# Patient Record
Sex: MALE | Race: WHITE | NOT HISPANIC OR LATINO | Employment: OTHER | URBAN - METROPOLITAN AREA
[De-identification: names, ages, dates, MRNs, and addresses within clinical notes are randomized per-mention and may not be internally consistent; named-entity substitution may affect disease eponyms.]

---

## 2023-02-19 PROBLEM — I48.91 ATRIAL FIBRILLATION (MULTI): Status: ACTIVE | Noted: 2023-02-19

## 2023-02-19 PROBLEM — E53.8 VITAMIN B12 DEFICIENCY: Status: ACTIVE | Noted: 2023-02-19

## 2023-02-19 PROBLEM — D68.9 COAGULOPATHY (MULTI): Status: ACTIVE | Noted: 2023-02-19

## 2023-02-19 PROBLEM — I35.0 AORTIC STENOSIS, MODERATE: Status: ACTIVE | Noted: 2023-02-19

## 2023-02-19 PROBLEM — I67.89 ACUTE ILL-DEFINED CEREBROVASCULAR DISEASE: Status: ACTIVE | Noted: 2023-02-19

## 2023-02-19 PROBLEM — R73.09 ABNORMAL GLUCOSE: Status: ACTIVE | Noted: 2023-02-19

## 2023-02-19 PROBLEM — K50.90 CROHN'S DISEASE (MULTI): Status: ACTIVE | Noted: 2023-02-19

## 2023-02-19 PROBLEM — R19.7 DIARRHEA: Status: ACTIVE | Noted: 2023-02-19

## 2023-02-19 PROBLEM — R01.1 HEART MURMUR: Status: ACTIVE | Noted: 2023-02-19

## 2023-02-19 PROBLEM — M10.9 GOUT, JOINT: Status: ACTIVE | Noted: 2023-02-19

## 2023-02-19 PROBLEM — L81.9 PIGMENTED SKIN LESION SUSPICIOUS FOR MALIGNANT NEOPLASM: Status: ACTIVE | Noted: 2023-02-19

## 2023-02-19 RX ORDER — LANOLIN ALCOHOL/MO/W.PET/CERES
1 CREAM (GRAM) TOPICAL DAILY
COMMUNITY
Start: 2018-08-15

## 2023-02-19 RX ORDER — WARFARIN 6 MG/1
1 TABLET ORAL
COMMUNITY
Start: 2015-03-17 | End: 2023-10-09 | Stop reason: SDUPTHER

## 2023-03-13 ENCOUNTER — APPOINTMENT (OUTPATIENT)
Dept: PRIMARY CARE | Facility: CLINIC | Age: 88
End: 2023-03-13
Payer: MEDICARE

## 2023-03-13 ENCOUNTER — ANTICOAGULATION - WARFARIN VISIT (OUTPATIENT)
Dept: PRIMARY CARE | Facility: CLINIC | Age: 88
End: 2023-03-13

## 2023-03-13 VITALS — SYSTOLIC BLOOD PRESSURE: 134 MMHG | DIASTOLIC BLOOD PRESSURE: 70 MMHG

## 2023-03-27 ENCOUNTER — APPOINTMENT (OUTPATIENT)
Dept: PRIMARY CARE | Facility: CLINIC | Age: 88
End: 2023-03-27
Payer: MEDICARE

## 2023-03-27 RX ORDER — WARFARIN 7.5 MG/1
TABLET ORAL
COMMUNITY
Start: 2013-11-11

## 2023-03-27 RX ORDER — POTASSIUM CHLORIDE 20 MEQ/1
20 TABLET, EXTENDED RELEASE ORAL 2 TIMES DAILY
COMMUNITY

## 2023-03-27 RX ORDER — DIGOXIN 125 MCG
0.12 TABLET ORAL
COMMUNITY
Start: 2014-04-16

## 2023-03-27 RX ORDER — WARFARIN 3 MG/1
3 TABLET ORAL NIGHTLY
COMMUNITY
End: 2023-04-11 | Stop reason: SDUPTHER

## 2023-03-27 RX ORDER — ALLOPURINOL 300 MG/1
1 TABLET ORAL DAILY
COMMUNITY
Start: 2007-05-02

## 2023-03-27 RX ORDER — WARFARIN 3 MG/1
3 TABLET ORAL NIGHTLY
Qty: 90 TABLET | Refills: 1 | Status: CANCELLED | OUTPATIENT
Start: 2023-03-27

## 2023-04-11 ENCOUNTER — CLINICAL SUPPORT (OUTPATIENT)
Dept: PRIMARY CARE | Facility: CLINIC | Age: 88
End: 2023-04-11
Payer: MEDICARE

## 2023-04-11 ENCOUNTER — ANTICOAGULATION - WARFARIN VISIT (OUTPATIENT)
Dept: PRIMARY CARE | Facility: CLINIC | Age: 88
End: 2023-04-11

## 2023-04-11 DIAGNOSIS — I48.11 LONGSTANDING PERSISTENT ATRIAL FIBRILLATION (MULTI): ICD-10-CM

## 2023-04-11 DIAGNOSIS — I48.21 PERMANENT ATRIAL FIBRILLATION (MULTI): ICD-10-CM

## 2023-04-11 DIAGNOSIS — I48.91 ATRIAL FIBRILLATION, UNSPECIFIED TYPE (MULTI): Primary | ICD-10-CM

## 2023-04-11 LAB
POC INR: 2.7
POC INR: 2.7
POC PROTHROMBIN TIME: NORMAL
POC PROTHROMBIN TIME: NORMAL

## 2023-04-11 PROCEDURE — 85610 PROTHROMBIN TIME: CPT | Performed by: STUDENT IN AN ORGANIZED HEALTH CARE EDUCATION/TRAINING PROGRAM

## 2023-04-17 RX ORDER — WARFARIN 3 MG/1
3 TABLET ORAL NIGHTLY
Qty: 90 TABLET | Refills: 1 | Status: SHIPPED | OUTPATIENT
Start: 2023-04-17 | End: 2023-10-09 | Stop reason: SDUPTHER

## 2023-05-08 ENCOUNTER — ANTICOAGULATION - WARFARIN VISIT (OUTPATIENT)
Dept: PRIMARY CARE | Facility: CLINIC | Age: 88
End: 2023-05-08

## 2023-05-08 ENCOUNTER — APPOINTMENT (OUTPATIENT)
Dept: PRIMARY CARE | Facility: CLINIC | Age: 88
End: 2023-05-08
Payer: MEDICARE

## 2023-05-08 DIAGNOSIS — I48.21 PERMANENT ATRIAL FIBRILLATION (MULTI): ICD-10-CM

## 2023-05-08 LAB
POC INR: 2.6
POC PROTHROMBIN TIME: NORMAL

## 2023-05-08 PROCEDURE — 85610 PROTHROMBIN TIME: CPT | Performed by: STUDENT IN AN ORGANIZED HEALTH CARE EDUCATION/TRAINING PROGRAM

## 2023-06-05 ENCOUNTER — ANTICOAGULATION - WARFARIN VISIT (OUTPATIENT)
Dept: PRIMARY CARE | Facility: CLINIC | Age: 88
End: 2023-06-05

## 2023-06-05 ENCOUNTER — APPOINTMENT (OUTPATIENT)
Dept: PRIMARY CARE | Facility: CLINIC | Age: 88
End: 2023-06-05
Payer: MEDICARE

## 2023-06-05 DIAGNOSIS — I48.91 ATRIAL FIBRILLATION, UNSPECIFIED TYPE (MULTI): ICD-10-CM

## 2023-06-05 LAB
POC INR: 2.5
POC PROTHROMBIN TIME: NORMAL

## 2023-07-03 ENCOUNTER — CLINICAL SUPPORT (OUTPATIENT)
Dept: PRIMARY CARE | Facility: CLINIC | Age: 88
End: 2023-07-03
Payer: MEDICARE

## 2023-07-03 DIAGNOSIS — I48.91 ATRIAL FIBRILLATION, UNSPECIFIED TYPE (MULTI): ICD-10-CM

## 2023-07-03 LAB
POC INR: 3.4
POC PROTHROMBIN TIME: NORMAL

## 2023-07-03 PROCEDURE — 85610 PROTHROMBIN TIME: CPT | Performed by: STUDENT IN AN ORGANIZED HEALTH CARE EDUCATION/TRAINING PROGRAM

## 2023-07-05 ENCOUNTER — OFFICE VISIT (OUTPATIENT)
Dept: PRIMARY CARE | Facility: CLINIC | Age: 88
End: 2023-07-05
Payer: MEDICARE

## 2023-07-05 DIAGNOSIS — I48.91 ATRIAL FIBRILLATION, UNSPECIFIED TYPE (MULTI): ICD-10-CM

## 2023-07-05 LAB
POC INR: 2.3
POC PROTHROMBIN TIME: NORMAL

## 2023-07-05 PROCEDURE — 1159F MED LIST DOCD IN RCRD: CPT | Performed by: STUDENT IN AN ORGANIZED HEALTH CARE EDUCATION/TRAINING PROGRAM

## 2023-07-05 PROCEDURE — 85610 PROTHROMBIN TIME: CPT | Performed by: STUDENT IN AN ORGANIZED HEALTH CARE EDUCATION/TRAINING PROGRAM

## 2023-07-07 ENCOUNTER — ANTICOAGULATION - WARFARIN VISIT (OUTPATIENT)
Dept: PRIMARY CARE | Facility: CLINIC | Age: 88
End: 2023-07-07
Payer: MEDICARE

## 2023-07-07 DIAGNOSIS — I48.91 ATRIAL FIBRILLATION, UNSPECIFIED TYPE (MULTI): ICD-10-CM

## 2023-07-07 LAB
POC INR: 3.4
POC INR: 3.4
POC PROTHROMBIN TIME: NORMAL
POC PROTHROMBIN TIME: NORMAL

## 2023-07-07 PROCEDURE — 85610 PROTHROMBIN TIME: CPT | Performed by: STUDENT IN AN ORGANIZED HEALTH CARE EDUCATION/TRAINING PROGRAM

## 2023-07-10 ENCOUNTER — ANTICOAGULATION - WARFARIN VISIT (OUTPATIENT)
Dept: PRIMARY CARE | Facility: CLINIC | Age: 88
End: 2023-07-10

## 2023-07-10 ENCOUNTER — CLINICAL SUPPORT (OUTPATIENT)
Dept: PRIMARY CARE | Facility: CLINIC | Age: 88
End: 2023-07-10
Payer: MEDICARE

## 2023-07-10 DIAGNOSIS — I48.91 ATRIAL FIBRILLATION, UNSPECIFIED TYPE (MULTI): ICD-10-CM

## 2023-07-10 LAB
POC INR: 2
POC INR: 2
POC PROTHROMBIN TIME: NORMAL
POC PROTHROMBIN TIME: NORMAL

## 2023-07-10 PROCEDURE — 85610 PROTHROMBIN TIME: CPT | Performed by: STUDENT IN AN ORGANIZED HEALTH CARE EDUCATION/TRAINING PROGRAM

## 2023-07-17 ENCOUNTER — ANTICOAGULATION - WARFARIN VISIT (OUTPATIENT)
Dept: PRIMARY CARE | Facility: CLINIC | Age: 88
End: 2023-07-17

## 2023-07-17 ENCOUNTER — APPOINTMENT (OUTPATIENT)
Dept: PRIMARY CARE | Facility: CLINIC | Age: 88
End: 2023-07-17
Payer: MEDICARE

## 2023-07-17 DIAGNOSIS — I48.91 ATRIAL FIBRILLATION, UNSPECIFIED TYPE (MULTI): ICD-10-CM

## 2023-07-17 LAB
POC INR: 3
POC PROTHROMBIN TIME: NORMAL

## 2023-07-24 ENCOUNTER — APPOINTMENT (OUTPATIENT)
Dept: PRIMARY CARE | Facility: CLINIC | Age: 88
End: 2023-07-24
Payer: MEDICARE

## 2023-07-24 ENCOUNTER — ANTICOAGULATION - WARFARIN VISIT (OUTPATIENT)
Dept: PRIMARY CARE | Facility: CLINIC | Age: 88
End: 2023-07-24

## 2023-07-24 DIAGNOSIS — I48.91 ATRIAL FIBRILLATION, UNSPECIFIED TYPE (MULTI): ICD-10-CM

## 2023-07-24 LAB
POC INR: 3
POC PROTHROMBIN TIME: NORMAL

## 2023-08-07 ENCOUNTER — ANTICOAGULATION - WARFARIN VISIT (OUTPATIENT)
Dept: PRIMARY CARE | Facility: CLINIC | Age: 88
End: 2023-08-07

## 2023-08-07 ENCOUNTER — APPOINTMENT (OUTPATIENT)
Dept: PRIMARY CARE | Facility: CLINIC | Age: 88
End: 2023-08-07
Payer: MEDICARE

## 2023-08-07 DIAGNOSIS — I48.91 ATRIAL FIBRILLATION, UNSPECIFIED TYPE (MULTI): ICD-10-CM

## 2023-08-07 LAB
POC INR: 2.2
POC PROTHROMBIN TIME: NORMAL

## 2023-08-07 PROCEDURE — 85610 PROTHROMBIN TIME: CPT | Performed by: STUDENT IN AN ORGANIZED HEALTH CARE EDUCATION/TRAINING PROGRAM

## 2023-09-05 ENCOUNTER — APPOINTMENT (OUTPATIENT)
Dept: PRIMARY CARE | Facility: CLINIC | Age: 88
End: 2023-09-05
Payer: MEDICARE

## 2023-09-05 ENCOUNTER — ANTICOAGULATION - WARFARIN VISIT (OUTPATIENT)
Dept: PRIMARY CARE | Facility: CLINIC | Age: 88
End: 2023-09-05

## 2023-09-05 DIAGNOSIS — I48.91 ATRIAL FIBRILLATION, UNSPECIFIED TYPE (MULTI): ICD-10-CM

## 2023-09-05 PROBLEM — K80.20 GALLSTONE: Status: RESOLVED | Noted: 2023-09-05 | Resolved: 2023-09-05

## 2023-09-05 PROBLEM — L82.1 OTHER SEBORRHEIC KERATOSIS: Status: RESOLVED | Noted: 2019-09-27 | Resolved: 2023-09-05

## 2023-09-05 PROBLEM — D18.01 HEMANGIOMA OF SKIN AND SUBCUTANEOUS TISSUE: Status: RESOLVED | Noted: 2019-09-27 | Resolved: 2023-09-05

## 2023-09-05 PROBLEM — D48.5 NEOPLASM OF UNCERTAIN BEHAVIOR OF SKIN: Status: RESOLVED | Noted: 2019-09-27 | Resolved: 2023-09-05

## 2023-09-05 PROBLEM — C44.319 BASAL CELL CARCINOMA OF SKIN OF OTHER PARTS OF FACE: Status: RESOLVED | Noted: 2019-09-27 | Resolved: 2023-09-05

## 2023-09-05 PROBLEM — I47.19 ATRIAL TACHYCARDIA (CMS-HCC): Status: ACTIVE | Noted: 2023-02-19

## 2023-09-05 PROBLEM — L57.0 ACTINIC KERATOSIS: Status: RESOLVED | Noted: 2019-09-27 | Resolved: 2023-09-05

## 2023-09-05 PROBLEM — G45.9 TIA (TRANSIENT ISCHEMIC ATTACK): Status: ACTIVE | Noted: 2023-02-19

## 2023-09-05 PROBLEM — D64.9 ABSOLUTE ANEMIA: Status: RESOLVED | Noted: 2023-09-05 | Resolved: 2023-09-05

## 2023-09-05 LAB
POC INR: 2.3
POC PROTHROMBIN TIME: NORMAL

## 2023-09-05 PROCEDURE — 85610 PROTHROMBIN TIME: CPT | Performed by: STUDENT IN AN ORGANIZED HEALTH CARE EDUCATION/TRAINING PROGRAM

## 2023-10-02 ENCOUNTER — APPOINTMENT (OUTPATIENT)
Dept: PRIMARY CARE | Facility: CLINIC | Age: 88
End: 2023-10-02
Payer: MEDICARE

## 2023-10-02 ENCOUNTER — ANTICOAGULATION - WARFARIN VISIT (OUTPATIENT)
Dept: PRIMARY CARE | Facility: CLINIC | Age: 88
End: 2023-10-02

## 2023-10-02 DIAGNOSIS — I48.91 ATRIAL FIBRILLATION, UNSPECIFIED TYPE (MULTI): ICD-10-CM

## 2023-10-02 LAB
POC INR: 2.5
POC PROTHROMBIN TIME: NORMAL

## 2023-10-02 PROCEDURE — 85610 PROTHROMBIN TIME: CPT | Performed by: STUDENT IN AN ORGANIZED HEALTH CARE EDUCATION/TRAINING PROGRAM

## 2023-10-09 DIAGNOSIS — I48.91 ATRIAL FIBRILLATION, UNSPECIFIED TYPE (MULTI): ICD-10-CM

## 2023-10-09 RX ORDER — WARFARIN 3 MG/1
3 TABLET ORAL NIGHTLY
Qty: 90 TABLET | Refills: 1 | Status: SHIPPED | OUTPATIENT
Start: 2023-10-09

## 2023-10-09 RX ORDER — WARFARIN 6 MG/1
6 TABLET ORAL NIGHTLY
Qty: 90 TABLET | Refills: 1 | Status: SHIPPED | OUTPATIENT
Start: 2023-10-09

## 2023-10-30 ENCOUNTER — ANTICOAGULATION - WARFARIN VISIT (OUTPATIENT)
Dept: PRIMARY CARE | Facility: CLINIC | Age: 88
End: 2023-10-30

## 2023-10-30 ENCOUNTER — APPOINTMENT (OUTPATIENT)
Dept: PRIMARY CARE | Facility: CLINIC | Age: 88
End: 2023-10-30
Payer: MEDICARE

## 2023-10-30 DIAGNOSIS — I48.91 ATRIAL FIBRILLATION, UNSPECIFIED TYPE (MULTI): ICD-10-CM

## 2023-10-30 LAB
POC INR: 2.5
POC PROTHROMBIN TIME: NORMAL

## 2023-10-30 PROCEDURE — 85610 PROTHROMBIN TIME: CPT | Performed by: STUDENT IN AN ORGANIZED HEALTH CARE EDUCATION/TRAINING PROGRAM

## 2023-11-27 ENCOUNTER — CLINICAL SUPPORT (OUTPATIENT)
Dept: PRIMARY CARE | Facility: CLINIC | Age: 88
End: 2023-11-27
Payer: MEDICARE

## 2023-11-27 ENCOUNTER — ANTICOAGULATION - WARFARIN VISIT (OUTPATIENT)
Dept: PRIMARY CARE | Facility: CLINIC | Age: 88
End: 2023-11-27

## 2023-11-27 DIAGNOSIS — I47.19 ATRIAL TACHYCARDIA (CMS-HCC): ICD-10-CM

## 2023-11-27 DIAGNOSIS — I48.91 ATRIAL FIBRILLATION, UNSPECIFIED TYPE (MULTI): ICD-10-CM

## 2023-11-27 LAB
POC INR: 3
POC PROTHROMBIN TIME: NORMAL

## 2023-11-27 PROCEDURE — 85610 PROTHROMBIN TIME: CPT | Performed by: STUDENT IN AN ORGANIZED HEALTH CARE EDUCATION/TRAINING PROGRAM

## 2023-12-22 ENCOUNTER — ANTICOAGULATION - WARFARIN VISIT (OUTPATIENT)
Dept: PRIMARY CARE | Facility: CLINIC | Age: 88
End: 2023-12-22

## 2023-12-22 ENCOUNTER — APPOINTMENT (OUTPATIENT)
Dept: PRIMARY CARE | Facility: CLINIC | Age: 88
End: 2023-12-22
Payer: MEDICARE

## 2023-12-22 DIAGNOSIS — I48.91 ATRIAL FIBRILLATION, UNSPECIFIED TYPE (MULTI): ICD-10-CM

## 2023-12-22 LAB
POC INR: 2.7
POC PROTHROMBIN TIME: NORMAL

## 2023-12-22 PROCEDURE — 85610 PROTHROMBIN TIME: CPT | Performed by: STUDENT IN AN ORGANIZED HEALTH CARE EDUCATION/TRAINING PROGRAM

## 2024-01-01 ENCOUNTER — HOSPITAL ENCOUNTER (INPATIENT)
Facility: HOSPITAL | Age: 89
LOS: 1 days | End: 2024-07-12
Attending: EMERGENCY MEDICINE | Admitting: INTERNAL MEDICINE
Payer: MEDICARE

## 2024-01-01 ENCOUNTER — APPOINTMENT (OUTPATIENT)
Dept: RADIOLOGY | Facility: HOSPITAL | Age: 89
End: 2024-01-01
Payer: MEDICARE

## 2024-01-01 ENCOUNTER — TELEPHONE (OUTPATIENT)
Dept: PRIMARY CARE | Facility: CLINIC | Age: 89
End: 2024-01-01
Payer: MEDICARE

## 2024-01-01 VITALS
TEMPERATURE: 97.7 F | OXYGEN SATURATION: 90 % | WEIGHT: 160.94 LBS | DIASTOLIC BLOOD PRESSURE: 91 MMHG | HEART RATE: 91 BPM | SYSTOLIC BLOOD PRESSURE: 188 MMHG | BODY MASS INDEX: 21.33 KG/M2 | RESPIRATION RATE: 24 BRPM | HEIGHT: 73 IN

## 2024-01-01 DIAGNOSIS — I61.9 HEMORRHAGIC STROKE (MULTI): Primary | ICD-10-CM

## 2024-01-01 LAB
ALBUMIN SERPL BCP-MCNC: 4.4 G/DL (ref 3.4–5)
ALP SERPL-CCNC: 57 U/L (ref 33–136)
ALT SERPL W P-5'-P-CCNC: 12 U/L (ref 10–52)
ANION GAP SERPL CALC-SCNC: 11 MMOL/L (ref 10–20)
APTT PPP: 36 SECONDS (ref 27–38)
AST SERPL W P-5'-P-CCNC: 23 U/L (ref 9–39)
BASOPHILS # BLD AUTO: 0.03 X10*3/UL (ref 0–0.1)
BASOPHILS NFR BLD AUTO: 0.3 %
BILIRUB SERPL-MCNC: 0.5 MG/DL (ref 0–1.2)
BUN SERPL-MCNC: 21 MG/DL (ref 6–23)
CALCIUM SERPL-MCNC: 9.3 MG/DL (ref 8.6–10.3)
CARDIAC TROPONIN I PNL SERPL HS: 11 NG/L (ref 0–20)
CHLORIDE SERPL-SCNC: 105 MMOL/L (ref 98–107)
CO2 SERPL-SCNC: 28 MMOL/L (ref 21–32)
CREAT SERPL-MCNC: 0.92 MG/DL (ref 0.5–1.3)
EGFRCR SERPLBLD CKD-EPI 2021: 80 ML/MIN/1.73M*2
EOSINOPHIL # BLD AUTO: 0.11 X10*3/UL (ref 0–0.4)
EOSINOPHIL NFR BLD AUTO: 1 %
ERYTHROCYTE [DISTWIDTH] IN BLOOD BY AUTOMATED COUNT: 13.8 % (ref 11.5–14.5)
GLUCOSE SERPL-MCNC: 149 MG/DL (ref 74–99)
HCT VFR BLD AUTO: 33.7 % (ref 41–52)
HGB BLD-MCNC: 11.4 G/DL (ref 13.5–17.5)
HOLD SPECIMEN: NORMAL
IMM GRANULOCYTES # BLD AUTO: 0.04 X10*3/UL (ref 0–0.5)
IMM GRANULOCYTES NFR BLD AUTO: 0.4 % (ref 0–0.9)
INR PPP: 2.3 (ref 0.9–1.1)
LYMPHOCYTES # BLD AUTO: 3.88 X10*3/UL (ref 0.8–3)
LYMPHOCYTES NFR BLD AUTO: 35.9 %
MCH RBC QN AUTO: 33.5 PG (ref 26–34)
MCHC RBC AUTO-ENTMCNC: 33.8 G/DL (ref 32–36)
MCV RBC AUTO: 99 FL (ref 80–100)
MONOCYTES # BLD AUTO: 0.81 X10*3/UL (ref 0.05–0.8)
MONOCYTES NFR BLD AUTO: 7.5 %
NEUTROPHILS # BLD AUTO: 5.95 X10*3/UL (ref 1.6–5.5)
NEUTROPHILS NFR BLD AUTO: 54.9 %
NRBC BLD-RTO: 0 /100 WBCS (ref 0–0)
PLATELET # BLD AUTO: 219 X10*3/UL (ref 150–450)
POTASSIUM SERPL-SCNC: 3.4 MMOL/L (ref 3.5–5.3)
PROT SERPL-MCNC: 7.3 G/DL (ref 6.4–8.2)
PROTHROMBIN TIME: 25.6 SECONDS (ref 9.8–12.8)
RBC # BLD AUTO: 3.4 X10*6/UL (ref 4.5–5.9)
SODIUM SERPL-SCNC: 141 MMOL/L (ref 136–145)
WBC # BLD AUTO: 10.8 X10*3/UL (ref 4.4–11.3)

## 2024-01-01 PROCEDURE — 85730 THROMBOPLASTIN TIME PARTIAL: CPT | Performed by: EMERGENCY MEDICINE

## 2024-01-01 PROCEDURE — 2500000004 HC RX 250 GENERAL PHARMACY W/ HCPCS (ALT 636 FOR OP/ED)

## 2024-01-01 PROCEDURE — 96374 THER/PROPH/DIAG INJ IV PUSH: CPT

## 2024-01-01 PROCEDURE — 36415 COLL VENOUS BLD VENIPUNCTURE: CPT | Performed by: EMERGENCY MEDICINE

## 2024-01-01 PROCEDURE — 70450 CT HEAD/BRAIN W/O DYE: CPT

## 2024-01-01 PROCEDURE — 85610 PROTHROMBIN TIME: CPT | Performed by: EMERGENCY MEDICINE

## 2024-01-01 PROCEDURE — 85025 COMPLETE CBC W/AUTO DIFF WBC: CPT | Performed by: EMERGENCY MEDICINE

## 2024-01-01 PROCEDURE — 80053 COMPREHEN METABOLIC PANEL: CPT | Performed by: EMERGENCY MEDICINE

## 2024-01-01 PROCEDURE — 2500000004 HC RX 250 GENERAL PHARMACY W/ HCPCS (ALT 636 FOR OP/ED): Performed by: EMERGENCY MEDICINE

## 2024-01-01 PROCEDURE — 70450 CT HEAD/BRAIN W/O DYE: CPT | Performed by: RADIOLOGY

## 2024-01-01 PROCEDURE — 1210000001 HC SEMI-PRIVATE ROOM DAILY

## 2024-01-01 PROCEDURE — 84484 ASSAY OF TROPONIN QUANT: CPT | Performed by: EMERGENCY MEDICINE

## 2024-01-01 PROCEDURE — 99291 CRITICAL CARE FIRST HOUR: CPT | Performed by: EMERGENCY MEDICINE

## 2024-01-01 RX ORDER — LEVETIRACETAM 15 MG/ML
1500 INJECTION INTRAVASCULAR ONCE
Status: COMPLETED | OUTPATIENT
Start: 2024-01-01 | End: 2024-01-01

## 2024-01-01 RX ORDER — LORAZEPAM 2 MG/ML
0.5 INJECTION INTRAMUSCULAR EVERY 4 HOURS PRN
Status: DISCONTINUED | OUTPATIENT
Start: 2024-01-01 | End: 2024-01-01 | Stop reason: HOSPADM

## 2024-01-01 RX ORDER — HYDROMORPHONE HYDROCHLORIDE 2 MG/1
1 TABLET ORAL
Status: DISCONTINUED | OUTPATIENT
Start: 2024-01-01 | End: 2024-01-01 | Stop reason: HOSPADM

## 2024-01-01 RX ORDER — LORAZEPAM 2 MG/ML
2 INJECTION INTRAMUSCULAR EVERY 10 MIN PRN
Status: COMPLETED | OUTPATIENT
Start: 2024-01-01 | End: 2024-01-01

## 2024-01-01 RX ORDER — MORPHINE SULFATE 4 MG/ML
4 INJECTION, SOLUTION INTRAMUSCULAR; INTRAVENOUS
Status: DISCONTINUED | OUTPATIENT
Start: 2024-01-01 | End: 2024-01-01 | Stop reason: HOSPADM

## 2024-01-01 RX ORDER — NICARDIPINE HYDROCHLORIDE 0.2 MG/ML
2.5-15 INJECTION INTRAVENOUS CONTINUOUS
Status: DISCONTINUED | OUTPATIENT
Start: 2024-01-01 | End: 2024-01-01

## 2024-01-01 RX ORDER — KETOROLAC TROMETHAMINE 30 MG/ML
15 INJECTION, SOLUTION INTRAMUSCULAR; INTRAVENOUS EVERY 6 HOURS PRN
Status: DISCONTINUED | OUTPATIENT
Start: 2024-01-01 | End: 2024-01-01

## 2024-01-01 RX ORDER — HALOPERIDOL 5 MG/ML
1 INJECTION INTRAMUSCULAR EVERY 4 HOURS PRN
Status: DISCONTINUED | OUTPATIENT
Start: 2024-01-01 | End: 2024-01-01 | Stop reason: HOSPADM

## 2024-01-01 RX ORDER — GLYCOPYRROLATE 0.2 MG/ML
0.2 INJECTION INTRAMUSCULAR; INTRAVENOUS EVERY 4 HOURS PRN
Status: DISCONTINUED | OUTPATIENT
Start: 2024-01-01 | End: 2024-01-01 | Stop reason: HOSPADM

## 2024-01-01 SDOH — SOCIAL STABILITY: SOCIAL INSECURITY: DOES ANYONE TRY TO KEEP YOU FROM HAVING/CONTACTING OTHER FRIENDS OR DOING THINGS OUTSIDE YOUR HOME?: UNABLE TO ASSESS

## 2024-01-01 SDOH — SOCIAL STABILITY: SOCIAL INSECURITY: HAVE YOU HAD THOUGHTS OF HARMING ANYONE ELSE?: UNABLE TO ASSESS

## 2024-01-01 SDOH — SOCIAL STABILITY: SOCIAL INSECURITY: ARE THERE ANY APPARENT SIGNS OF INJURIES/BEHAVIORS THAT COULD BE RELATED TO ABUSE/NEGLECT?: UNABLE TO ASSESS

## 2024-01-01 SDOH — SOCIAL STABILITY: SOCIAL INSECURITY: HAS ANYONE EVER THREATENED TO HURT YOUR FAMILY OR YOUR PETS?: UNABLE TO ASSESS

## 2024-01-01 SDOH — SOCIAL STABILITY: SOCIAL INSECURITY: DO YOU FEEL ANYONE HAS EXPLOITED OR TAKEN ADVANTAGE OF YOU FINANCIALLY OR OF YOUR PERSONAL PROPERTY?: UNABLE TO ASSESS

## 2024-01-01 SDOH — SOCIAL STABILITY: SOCIAL INSECURITY: HAVE YOU HAD ANY THOUGHTS OF HARMING ANYONE ELSE?: UNABLE TO ASSESS

## 2024-01-01 SDOH — SOCIAL STABILITY: SOCIAL INSECURITY: ARE YOU OR HAVE YOU BEEN THREATENED OR ABUSED PHYSICALLY, EMOTIONALLY, OR SEXUALLY BY ANYONE?: UNABLE TO ASSESS

## 2024-01-01 SDOH — SOCIAL STABILITY: SOCIAL INSECURITY: ABUSE: ADULT

## 2024-01-01 SDOH — SOCIAL STABILITY: SOCIAL INSECURITY: WERE YOU ABLE TO COMPLETE ALL THE BEHAVIORAL HEALTH SCREENINGS?: NO

## 2024-01-01 SDOH — SOCIAL STABILITY: SOCIAL INSECURITY: DO YOU FEEL UNSAFE GOING BACK TO THE PLACE WHERE YOU ARE LIVING?: UNABLE TO ASSESS

## 2024-01-01 ASSESSMENT — LIFESTYLE VARIABLES
HAVE PEOPLE ANNOYED YOU BY CRITICIZING YOUR DRINKING: NO
EVER HAD A DRINK FIRST THING IN THE MORNING TO STEADY YOUR NERVES TO GET RID OF A HANGOVER: NO
TOTAL SCORE: 0
HAVE YOU EVER FELT YOU SHOULD CUT DOWN ON YOUR DRINKING: NO
EVER FELT BAD OR GUILTY ABOUT YOUR DRINKING: NO
AUDIT-C TOTAL SCORE: -1
HOW OFTEN DO YOU HAVE A DRINK CONTAINING ALCOHOL: PATIENT UNABLE TO ANSWER
AUDIT-C TOTAL SCORE: -1
HOW OFTEN DO YOU HAVE 6 OR MORE DRINKS ON ONE OCCASION: PATIENT UNABLE TO ANSWER
SKIP TO QUESTIONS 9-10: 0
HOW MANY STANDARD DRINKS CONTAINING ALCOHOL DO YOU HAVE ON A TYPICAL DAY: PATIENT UNABLE TO ANSWER

## 2024-01-01 ASSESSMENT — COGNITIVE AND FUNCTIONAL STATUS - GENERAL
MOBILITY SCORE: 6
WALKING IN HOSPITAL ROOM: TOTAL
CLIMB 3 TO 5 STEPS WITH RAILING: TOTAL
STANDING UP FROM CHAIR USING ARMS: TOTAL
PATIENT BASELINE BEDBOUND: NO
MOVING TO AND FROM BED TO CHAIR: TOTAL
EATING MEALS: TOTAL
DAILY ACTIVITIY SCORE: 21
MOVING FROM LYING ON BACK TO SITTING ON SIDE OF FLAT BED WITH BEDRAILS: TOTAL
TURNING FROM BACK TO SIDE WHILE IN FLAT BAD: TOTAL

## 2024-01-01 ASSESSMENT — PATIENT HEALTH QUESTIONNAIRE - PHQ9
1. LITTLE INTEREST OR PLEASURE IN DOING THINGS: NOT AT ALL
SUM OF ALL RESPONSES TO PHQ9 QUESTIONS 1 & 2: 0
2. FEELING DOWN, DEPRESSED OR HOPELESS: NOT AT ALL

## 2024-01-01 ASSESSMENT — ACTIVITIES OF DAILY LIVING (ADL)
BATHING: UNABLE TO ASSESS
ASSISTIVE_DEVICE: OTHER (COMMENT)
PATIENT'S MEMORY ADEQUATE TO SAFELY COMPLETE DAILY ACTIVITIES?: UNABLE TO ASSESS
TOILETING: UNABLE TO ASSESS
ADEQUATE_TO_COMPLETE_ADL: YES
LACK_OF_TRANSPORTATION: NO
HEARING - RIGHT EAR: FUNCTIONAL
FEEDING YOURSELF: UNABLE TO ASSESS
GROOMING: UNABLE TO ASSESS
HEARING - LEFT EAR: FUNCTIONAL
DRESSING YOURSELF: UNABLE TO ASSESS
WALKS IN HOME: UNABLE TO ASSESS
JUDGMENT_ADEQUATE_SAFELY_COMPLETE_DAILY_ACTIVITIES: UNABLE TO ASSESS

## 2024-01-01 ASSESSMENT — PAIN SCALES - GENERAL: PAINLEVEL_OUTOF10: 0 - NO PAIN

## 2024-01-22 ENCOUNTER — APPOINTMENT (OUTPATIENT)
Dept: PRIMARY CARE | Facility: CLINIC | Age: 89
End: 2024-01-22
Payer: MEDICARE

## 2024-01-22 ENCOUNTER — ANTICOAGULATION - WARFARIN VISIT (OUTPATIENT)
Dept: PRIMARY CARE | Facility: CLINIC | Age: 89
End: 2024-01-22

## 2024-01-22 DIAGNOSIS — I48.91 ATRIAL FIBRILLATION, UNSPECIFIED TYPE (MULTI): ICD-10-CM

## 2024-01-22 LAB
POC INR: 2.3
POC PROTHROMBIN TIME: NORMAL

## 2024-01-22 PROCEDURE — 85610 PROTHROMBIN TIME: CPT | Performed by: STUDENT IN AN ORGANIZED HEALTH CARE EDUCATION/TRAINING PROGRAM

## 2024-02-19 ENCOUNTER — OFFICE VISIT (OUTPATIENT)
Dept: PRIMARY CARE | Facility: CLINIC | Age: 89
End: 2024-02-19
Payer: MEDICARE

## 2024-02-19 VITALS
SYSTOLIC BLOOD PRESSURE: 124 MMHG | WEIGHT: 184 LBS | BODY MASS INDEX: 27.25 KG/M2 | HEIGHT: 69 IN | DIASTOLIC BLOOD PRESSURE: 76 MMHG

## 2024-02-19 DIAGNOSIS — Z00.00 HEALTHCARE MAINTENANCE: ICD-10-CM

## 2024-02-19 DIAGNOSIS — I48.91 ATRIAL FIBRILLATION, UNSPECIFIED TYPE (MULTI): ICD-10-CM

## 2024-02-19 DIAGNOSIS — I35.0 AORTIC STENOSIS, MODERATE: ICD-10-CM

## 2024-02-19 DIAGNOSIS — K50.90 CROHN'S DISEASE WITHOUT COMPLICATION, UNSPECIFIED GASTROINTESTINAL TRACT LOCATION (MULTI): ICD-10-CM

## 2024-02-19 DIAGNOSIS — E53.8 VITAMIN B12 DEFICIENCY: ICD-10-CM

## 2024-02-19 DIAGNOSIS — Z00.00 ROUTINE GENERAL MEDICAL EXAMINATION AT HEALTH CARE FACILITY: ICD-10-CM

## 2024-02-19 DIAGNOSIS — Z00.00 MEDICARE ANNUAL WELLNESS VISIT, SUBSEQUENT: ICD-10-CM

## 2024-02-19 DIAGNOSIS — R73.03 PRE-DIABETES: Primary | ICD-10-CM

## 2024-02-19 DIAGNOSIS — Z00.00 HEALTH CARE MAINTENANCE: ICD-10-CM

## 2024-02-19 PROCEDURE — 1170F FXNL STATUS ASSESSED: CPT | Performed by: STUDENT IN AN ORGANIZED HEALTH CARE EDUCATION/TRAINING PROGRAM

## 2024-02-19 PROCEDURE — 1160F RVW MEDS BY RX/DR IN RCRD: CPT | Performed by: STUDENT IN AN ORGANIZED HEALTH CARE EDUCATION/TRAINING PROGRAM

## 2024-02-19 PROCEDURE — 1159F MED LIST DOCD IN RCRD: CPT | Performed by: STUDENT IN AN ORGANIZED HEALTH CARE EDUCATION/TRAINING PROGRAM

## 2024-02-19 PROCEDURE — G0439 PPPS, SUBSEQ VISIT: HCPCS | Performed by: STUDENT IN AN ORGANIZED HEALTH CARE EDUCATION/TRAINING PROGRAM

## 2024-02-19 PROCEDURE — 99214 OFFICE O/P EST MOD 30 MIN: CPT | Performed by: STUDENT IN AN ORGANIZED HEALTH CARE EDUCATION/TRAINING PROGRAM

## 2024-02-19 ASSESSMENT — ACTIVITIES OF DAILY LIVING (ADL)
TAKING_MEDICATION: INDEPENDENT
GROCERY_SHOPPING: INDEPENDENT
BATHING: INDEPENDENT
DOING_HOUSEWORK: INDEPENDENT
DRESSING: INDEPENDENT
MANAGING_FINANCES: INDEPENDENT

## 2024-02-19 ASSESSMENT — ENCOUNTER SYMPTOMS
LOSS OF SENSATION IN FEET: 0
OCCASIONAL FEELINGS OF UNSTEADINESS: 0
DEPRESSION: 0

## 2024-02-19 NOTE — PROGRESS NOTES
"Subjective   Patient ID: Todd Escudero is a 89 y.o. male who presents for Medicare Annual Wellness Visit Subsequent (INR today 2.7, currently 3 mg sun., wed., Fri., the rest 6 mg).    HPI   Routine fu and wellness exam.    He feels well overall.    INR has been stable.    No recent falls.      Review of Systems  12-point ROS reviewed and was negative unless otherwise noted in HPI.    Objective   /76   Ht 1.753 m (5' 9\")   Wt 83.5 kg (184 lb)   BMI 27.17 kg/m²     Physical Exam  GEN: conversant, NAD  HEENT: PERRL, EOMI, MMM  NECK: supple, no carotid bruits appreciated b/l  CV: S1, S2, RRR, +ILENE  PULM: CTAB  ABD: soft, NT, ND  NEURO: no new gross focal deficits  EXT: no sig LE edema  PSYCH: appropriate affect    Assessment/Plan     #A. fib/atrial arrhythmia: Rhythm sounds regular. INR WNL today, continue current dose of warfarin. Following routinely with our Coumadin clinic. Rate is controlled off of the beta blocker.     #Aortic stenosis: moderate AS in 2022. Repeat echo     #B12 deficiency: Patient declines B12 injections. Continue oral supplementation.      #h/o TIA: Continue warfarin.     #Crohn's disease: s/p partial small bowel resection remotely. Refuses GI follow-up. Refuses colonoscopy screening.     #kidney stones: Following with urology at [facility]. Continue allopurinol.     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     #Health Maintenance: Patient refuses colonoscopy or cologuard testing. He has received COVID-19 vaccine. Advised yearly flu shot. Routine labs.     RTC 6-12 mo, patient refuses closer f/u     "

## 2024-03-18 ENCOUNTER — CLINICAL SUPPORT (OUTPATIENT)
Dept: PRIMARY CARE | Facility: CLINIC | Age: 89
End: 2024-03-18
Payer: MEDICARE

## 2024-03-18 ENCOUNTER — LAB (OUTPATIENT)
Dept: LAB | Facility: LAB | Age: 89
End: 2024-03-18
Payer: MEDICARE

## 2024-03-18 DIAGNOSIS — Z00.00 HEALTHCARE MAINTENANCE: ICD-10-CM

## 2024-03-18 DIAGNOSIS — I48.21 PERMANENT ATRIAL FIBRILLATION (MULTI): ICD-10-CM

## 2024-03-18 DIAGNOSIS — Z00.00 HEALTH CARE MAINTENANCE: ICD-10-CM

## 2024-03-18 LAB
ALBUMIN SERPL BCP-MCNC: 4.4 G/DL (ref 3.4–5)
ALP SERPL-CCNC: 57 U/L (ref 33–136)
ALT SERPL W P-5'-P-CCNC: 14 U/L (ref 10–52)
ANION GAP SERPL CALC-SCNC: 15 MMOL/L (ref 10–20)
AST SERPL W P-5'-P-CCNC: 18 U/L (ref 9–39)
BILIRUB SERPL-MCNC: 0.7 MG/DL (ref 0–1.2)
BUN SERPL-MCNC: 19 MG/DL (ref 6–23)
CALCIUM SERPL-MCNC: 9 MG/DL (ref 8.6–10.6)
CHLORIDE SERPL-SCNC: 105 MMOL/L (ref 98–107)
CHOLEST SERPL-MCNC: 154 MG/DL (ref 0–199)
CHOLESTEROL/HDL RATIO: 3.9
CO2 SERPL-SCNC: 28 MMOL/L (ref 21–32)
CREAT SERPL-MCNC: 0.9 MG/DL (ref 0.5–1.3)
EGFRCR SERPLBLD CKD-EPI 2021: 82 ML/MIN/1.73M*2
ERYTHROCYTE [DISTWIDTH] IN BLOOD BY AUTOMATED COUNT: 13.7 % (ref 11.5–14.5)
EST. AVERAGE GLUCOSE BLD GHB EST-MCNC: 117 MG/DL
GLUCOSE SERPL-MCNC: 127 MG/DL (ref 74–99)
HBA1C MFR BLD: 5.7 %
HCT VFR BLD AUTO: 35.3 % (ref 41–52)
HDLC SERPL-MCNC: 39.1 MG/DL
HGB BLD-MCNC: 12.1 G/DL (ref 13.5–17.5)
LDLC SERPL CALC-MCNC: 75 MG/DL
MCH RBC QN AUTO: 33.4 PG (ref 26–34)
MCHC RBC AUTO-ENTMCNC: 34.3 G/DL (ref 32–36)
MCV RBC AUTO: 98 FL (ref 80–100)
NON HDL CHOLESTEROL: 115 MG/DL (ref 0–149)
NRBC BLD-RTO: 0 /100 WBCS (ref 0–0)
PLATELET # BLD AUTO: 173 X10*3/UL (ref 150–450)
POC INR: 2.6
POC PROTHROMBIN TIME: NORMAL
POTASSIUM SERPL-SCNC: 3.8 MMOL/L (ref 3.5–5.3)
PROT SERPL-MCNC: 6.6 G/DL (ref 6.4–8.2)
RBC # BLD AUTO: 3.62 X10*6/UL (ref 4.5–5.9)
SODIUM SERPL-SCNC: 144 MMOL/L (ref 136–145)
TRIGL SERPL-MCNC: 201 MG/DL (ref 0–149)
VLDL: 40 MG/DL (ref 0–40)
WBC # BLD AUTO: 6.3 X10*3/UL (ref 4.4–11.3)

## 2024-03-18 PROCEDURE — 36415 COLL VENOUS BLD VENIPUNCTURE: CPT

## 2024-03-18 PROCEDURE — 83036 HEMOGLOBIN GLYCOSYLATED A1C: CPT

## 2024-03-18 PROCEDURE — 80061 LIPID PANEL: CPT

## 2024-03-18 PROCEDURE — 80053 COMPREHEN METABOLIC PANEL: CPT

## 2024-03-18 PROCEDURE — 85027 COMPLETE CBC AUTOMATED: CPT

## 2024-04-15 ENCOUNTER — CLINICAL SUPPORT (OUTPATIENT)
Dept: PRIMARY CARE | Facility: CLINIC | Age: 89
End: 2024-04-15
Payer: MEDICARE

## 2024-04-15 DIAGNOSIS — I48.21 PERMANENT ATRIAL FIBRILLATION (MULTI): ICD-10-CM

## 2024-04-15 LAB
POC INR: 2.4
POC PROTHROMBIN TIME: NORMAL

## 2024-05-13 ENCOUNTER — CLINICAL SUPPORT (OUTPATIENT)
Dept: PRIMARY CARE | Facility: CLINIC | Age: 89
End: 2024-05-13
Payer: MEDICARE

## 2024-05-13 DIAGNOSIS — I48.21 PERMANENT ATRIAL FIBRILLATION (MULTI): ICD-10-CM

## 2024-05-13 LAB
POC INR: 2.7
POC PROTHROMBIN TIME: NORMAL

## 2024-06-10 ENCOUNTER — CLINICAL SUPPORT (OUTPATIENT)
Dept: PRIMARY CARE | Facility: CLINIC | Age: 89
End: 2024-06-10
Payer: MEDICARE

## 2024-06-10 DIAGNOSIS — I48.21 PERMANENT ATRIAL FIBRILLATION (MULTI): ICD-10-CM

## 2024-06-10 LAB
POC INR: 2.8
POC PROTHROMBIN TIME: NORMAL

## 2024-07-02 ENCOUNTER — HOSPITAL ENCOUNTER (OUTPATIENT)
Dept: RADIOLOGY | Facility: CLINIC | Age: 89
Discharge: HOME | End: 2024-07-02
Payer: MEDICARE

## 2024-07-02 DIAGNOSIS — S67.21XA CRUSHING INJURY OF RIGHT HAND, INITIAL ENCOUNTER: ICD-10-CM

## 2024-07-02 PROCEDURE — 73130 X-RAY EXAM OF HAND: CPT | Mod: RT

## 2024-07-02 PROCEDURE — 73130 X-RAY EXAM OF HAND: CPT | Mod: RIGHT SIDE | Performed by: RADIOLOGY

## 2024-07-08 ENCOUNTER — APPOINTMENT (OUTPATIENT)
Dept: PRIMARY CARE | Facility: CLINIC | Age: 89
End: 2024-07-08
Payer: MEDICARE

## 2024-07-08 DIAGNOSIS — I48.21 PERMANENT ATRIAL FIBRILLATION (MULTI): ICD-10-CM

## 2024-07-08 LAB
POC INR: 2.3
POC PROTHROMBIN TIME: NORMAL

## 2024-07-08 PROCEDURE — 85610 PROTHROMBIN TIME: CPT | Performed by: STUDENT IN AN ORGANIZED HEALTH CARE EDUCATION/TRAINING PROGRAM

## 2024-07-11 PROBLEM — I61.9 HEMORRHAGIC STROKE (MULTI): Status: ACTIVE | Noted: 2024-01-01

## 2024-07-12 NOTE — CARE PLAN
Problem: Pain - Adult  Goal: Verbalizes/displays adequate comfort level or baseline comfort level  Outcome: Not Progressing     Problem: Safety - Adult  Goal: Free from fall injury  Outcome: Not Progressing     Problem: Discharge Planning  Goal: Discharge to home or other facility with appropriate resources  Outcome: Not Progressing     Problem: Chronic Conditions and Co-morbidities  Goal: Patient's chronic conditions and co-morbidity symptoms are monitored and maintained or improved  Outcome: Not Progressing     Problem: General Stroke  Goal: Establish a mutual long term goal with patient by discharge  Outcome: Not Progressing  Goal: Demonstrate improvement in neurological exam throughout the shift  Outcome: Not Progressing  Goal: Maintain BP within ordered limits throughout shift  Outcome: Not Progressing  Goal: Participate in treatment (ie., meds, therapy) throughout shift  Outcome: Not Progressing  Goal: No symptoms of aspiration throughout shift  Outcome: Not Progressing  Goal: No symptoms of hemorrhage throughout shift  Outcome: Not Progressing  Goal: Tolerate enteral feeding throughout shift  Outcome: Not Progressing  Goal: Decreased nausea/vomiting throughout shift  Outcome: Not Progressing  Goal: Controlled blood glucose throughout shift  Outcome: Not Progressing  Goal: Out of bed three times today  Outcome: Not Progressing     Problem: ICU Stroke  Goal: Maintain ICP within ordered limits throughout shift  Outcome: Not Progressing  Goal: Tolerate EVD clamping trial throughout shift  Outcome: Not Progressing  Goal: Tolerate ventilator weaning trial during shift  Outcome: Not Progressing  Goal: Maintain patent airway throughout shift  Outcome: Not Progressing  Goal: Achieve/maintain targeted sodium level throughout shift  Outcome: Not Progressing     Problem: Skin  Goal: Decreased wound size/increased tissue granulation at next dressing change  Outcome: Not Progressing  Goal: Participates in  plan/prevention/treatment measures  Outcome: Not Progressing  Goal: Prevent/manage excess moisture  Outcome: Not Progressing  Goal: Prevent/minimize sheer/friction injuries  Outcome: Not Progressing  Goal: Promote/optimize nutrition  Outcome: Not Progressing  Goal: Promote skin healing  Outcome: Not Progressing   The patient's goals for the shift include      The clinical goals for the shift include CC    Over the shift, the patient did not make progress toward the following goals. Barriers to progression include pt medical condition. Recommendations to address these barriers include DNR/CC.    Problem: Pain - Adult  Goal: Verbalizes/displays adequate comfort level or baseline comfort level  Outcome: Not Progressing     Problem: Safety - Adult  Goal: Free from fall injury  Outcome: Not Progressing     Problem: Discharge Planning  Goal: Discharge to home or other facility with appropriate resources  Outcome: Not Progressing     Problem: Chronic Conditions and Co-morbidities  Goal: Patient's chronic conditions and co-morbidity symptoms are monitored and maintained or improved  Outcome: Not Progressing     Problem: General Stroke  Goal: Establish a mutual long term goal with patient by discharge  Outcome: Not Progressing  Goal: Demonstrate improvement in neurological exam throughout the shift  Outcome: Not Progressing  Goal: Maintain BP within ordered limits throughout shift  Outcome: Not Progressing  Goal: Participate in treatment (ie., meds, therapy) throughout shift  Outcome: Not Progressing  Goal: No symptoms of aspiration throughout shift  Outcome: Not Progressing  Goal: No symptoms of hemorrhage throughout shift  Outcome: Not Progressing  Goal: Tolerate enteral feeding throughout shift  Outcome: Not Progressing  Goal: Decreased nausea/vomiting throughout shift  Outcome: Not Progressing  Goal: Controlled blood glucose throughout shift  Outcome: Not Progressing  Goal: Out of bed three times today  Outcome: Not  Progressing     Problem: ICU Stroke  Goal: Maintain ICP within ordered limits throughout shift  Outcome: Not Progressing  Goal: Tolerate EVD clamping trial throughout shift  Outcome: Not Progressing  Goal: Tolerate ventilator weaning trial during shift  Outcome: Not Progressing  Goal: Maintain patent airway throughout shift  Outcome: Not Progressing  Goal: Achieve/maintain targeted sodium level throughout shift  Outcome: Not Progressing     Problem: Skin  Goal: Decreased wound size/increased tissue granulation at next dressing change  Outcome: Not Progressing  Goal: Participates in plan/prevention/treatment measures  Outcome: Not Progressing  Goal: Prevent/manage excess moisture  Outcome: Not Progressing  Goal: Prevent/minimize sheer/friction injuries  Outcome: Not Progressing  Goal: Promote/optimize nutrition  Outcome: Not Progressing  Goal: Promote skin healing  Outcome: Not Progressing

## 2024-07-12 NOTE — H&P
Subjective   HPI  Todd Escudero is a 89 y.o. male with past medical history of Afib on warfarin, Aortic Stenosis, B12 deficiency, TIA, Crohn's disease who presented to [facility] ED on 7/11/24 with headache and altered mental status. EMS was called because of severe headache. On EMS arrival, patient became aphasic and began to have posturing.    In the ED, initial vitals were notable for hypertension with /116. Patient was noted by ED physician to not voice. He did not withdraw to pain and had fixed pupils. Initial labs notable for k 3.4, INR 2.2, PT 25.6, Hgb 11.4 (baseline around 12), CTH showed large hematomas with significant mass effect and brain herniation. ED provider discussed case with neurosurgery and family and there was agreement that patient was to transitoin to comfort care.    Patient admitted to General Medicine service for comfort measures and expectant transition to general inpatient hospice.    PMH: As stated above  PSH: As stated above  Social: Lives with wife at home  Family: Noncontributory  Medications/Allergies: NKDA    ROS: 12 systems reviewed and negative except otherwise noted in HPI above.    Objective   Vitals:    07/11/24 2345   BP: (!) 207/95   Pulse: 68   Resp: 17   Temp:    SpO2: 97%      Physical Exam  GEN: no significant response to pain  HEENT: pupils fixed and mid-dialated  NECK: supple, no cervical LAD, no carotid bruits  CV: S1, S2, regular, no murmur  PULM: CTAB  ABD: soft, NT, ND, BS+  EXT: no LE edema       Assessment/Plan   Todd Escudero is a 89 y.o. male with past medical history of Afib on warfarin, Aortic Stenosis, B12 deficiency, TIA, Crohn's disease who presented to [facility] ED on 7/11/24 with headache and altered mental status. Patient admitted to General Medicine service for comfort measures and expectant transition to general inpatient hospice.    #End of life care in setting of intracranial hemorrhage  - Comfort orders  - Inpatient hospice  consulted    Disposition: Acute    CODE: DNR- Comfort Measures Only  See significant event note. GOC discussed with Wife/POA - Rupa Escudero ([phone]) advise that patient would have wanted to pursue comfort measures at this time.    This is a preliminary note, please await attending attestation for a finalized plan.    Eladio Galarza MD  Internal Medicine PGY3  Please message me with any questions

## 2024-07-12 NOTE — NURSING NOTE
Nurse were called by family to check on pt. Appears not to be breathing. Dr. Galarza been notified by Doc HAlo

## 2024-07-12 NOTE — NURSING NOTE
All required paperwork have been filled out, [employer] called spoke to Joy,  received fax. Spoke to security Jared. Family still at bedside at this time.  Home called and spoke to  . Day shift RN Jarvis is taking over at this time. Report given. Paper work printed with 2 copies.

## 2024-07-12 NOTE — DISCHARGE SUMMARY
Discharge Diagnosis  Hemorrhagic stroke (Multi)      Issues Requiring Follow-Up  N/A    Discharge Meds     Your medication list        ASK your doctor about these medications        Instructions Last Dose Given Next Dose Due   allopurinol 300 mg tablet  Commonly known as: Zyloprim           cyanocobalamin 1,000 mcg tablet  Commonly known as: Vitamin B-12           digoxin 125 MCG tablet  Commonly known as: Lanoxin           multivitamin with minerals iron-free  Commonly known as: Centrum Silver           potassium chloride CR 20 mEq ER tablet  Commonly known as: Klor-Con M20           warfarin 7.5 mg tablet  Commonly known as: Coumadin      Take as directed. If you are unsure how to take this medication, talk to your nurse or doctor.       warfarin 3 mg tablet  Commonly known as: Coumadin      Take as directed. If you are unsure how to take this medication, talk to your nurse or doctor.  Original instructions: Take 1 tablet (3 mg) by mouth once daily at bedtime. Take as directed per After Visit Summary.       warfarin 6 mg tablet  Commonly known as: Coumadin      Take as directed. If you are unsure how to take this medication, talk to your nurse or doctor.  Original instructions: Take 1 tablet (6 mg) by mouth once daily at bedtime.                Test Results Pending At Discharge  Pending Labs       Order Current Status    Type and Screen Collected ([id number])            Hospital Course   Todd Escudero was a 89 y.o. male with past medical history of Afib on warfarin, Aortic Stenosis, B12 deficiency, TIA, Crohn's disease who presented to [facility] ED on 24 with headache and altered mental status. EMS was called because of severe headache. On EMS arrival, patient became aphasic and began to have posturing. In the ED, initial vitals were notable for hypertension with /116. Patient was noted by ED physician to not voice. He did not withdraw to pain and had fixed pupils. Initial labs notable for k  3.4, INR 2.2, PT 25.6, Hgb 11.4 (baseline around 12), CTH showed large hematomas with significant mass effect and brain herniation. ED provider discussed case with neurosurgery and family and there was agreement that patient was to transitoin to comfort care. Patient was admitted to General Medicine service for comfort measures and expectant transition to general inpatient hospice. Patient  at 6:14am on .     Pertinent Physical Exam At Time of Discharge  Physical Exam  GEN: no response to stimuli  HEENT: pupils fixed and mid-dialated  NECK: no carotid pulse  CV: No heart sounds were auscultated over entire precordium.   PULM: No breath sounds auscultated    Outpatient Follow-Up  Future Appointments   Date Time Provider Department Center   2024  1:30 PM  CTR [id number] [facility] 1 [id number] Lawrence Galarza MD

## 2024-07-12 NOTE — CARE PLAN
Problem: Pain - Adult  Goal: Verbalizes/displays adequate comfort level or baseline comfort level  Outcome: Not Progressing     Problem: Safety - Adult  Goal: Free from fall injury  Outcome: Not Progressing     Problem: Discharge Planning  Goal: Discharge to home or other facility with appropriate resources  Outcome: Not Progressing     Problem: Chronic Conditions and Co-morbidities  Goal: Patient's chronic conditions and co-morbidity symptoms are monitored and maintained or improved  Outcome: Not Progressing     Problem: General Stroke  Goal: Establish a mutual long term goal with patient by discharge  Outcome: Not Progressing  Goal: Demonstrate improvement in neurological exam throughout the shift  Outcome: Not Progressing  Goal: Maintain BP within ordered limits throughout shift  Outcome: Not Progressing  Goal: Participate in treatment (ie., meds, therapy) throughout shift  Outcome: Not Progressing  Goal: No symptoms of aspiration throughout shift  Outcome: Not Progressing  Goal: No symptoms of hemorrhage throughout shift  Outcome: Not Progressing  Goal: Tolerate enteral feeding throughout shift  Outcome: Not Progressing  Goal: Decreased nausea/vomiting throughout shift  Outcome: Not Progressing  Goal: Controlled blood glucose throughout shift  Outcome: Not Progressing  Goal: Out of bed three times today  Outcome: Not Progressing     Problem: ICU Stroke  Goal: Maintain ICP within ordered limits throughout shift  Outcome: Not Progressing  Goal: Tolerate EVD clamping trial throughout shift  Outcome: Not Progressing  Goal: Tolerate ventilator weaning trial during shift  Outcome: Not Progressing  Goal: Maintain patent airway throughout shift  Outcome: Not Progressing  Goal: Achieve/maintain targeted sodium level throughout shift  Outcome: Not Progressing     Problem: Skin  Goal: Decreased wound size/increased tissue granulation at next dressing change  7/12/2024 0207 by Sheyla Pace RN  Flowsheets (Taken 7/12/2024  0207)  Decreased wound size/increased tissue granulation at next dressing change: Protective dressings over bony prominences  7/12/2024 0206 by Sheyla Pace RN  Outcome: Not Progressing  Goal: Participates in plan/prevention/treatment measures  7/12/2024 0207 by Sheyla Pace RN  Flowsheets (Taken 7/12/2024 0207)  Participates in plan/prevention/treatment measures: Elevate heels  7/12/2024 0206 by Sheyla Pace RN  Outcome: Not Progressing  Goal: Prevent/manage excess moisture  7/12/2024 0207 by Sheyla Pace RN  Flowsheets (Taken 7/12/2024 0207)  Prevent/manage excess moisture: Cleanse incontinence/protect with barrier cream  7/12/2024 0206 by Sheyla Pace RN  Outcome: Not Progressing  Goal: Prevent/minimize sheer/friction injuries  7/12/2024 0207 by Sheyla Pace RN  Flowsheets (Taken 7/12/2024 0207)  Prevent/minimize sheer/friction injuries: Use pull sheet  7/12/2024 0206 by Sheyla Pace RN  Outcome: Not Progressing  Goal: Promote/optimize nutrition  7/12/2024 0207 by Sheyla Pace RN  Flowsheets (Taken 7/12/2024 0207)  Promote/optimize nutrition: Offer water/supplements/favorite foods  7/12/2024 0206 by Sheyla Pace RN  Outcome: Not Progressing  Goal: Promote skin healing  7/12/2024 0207 by Sheyla Pace RN  Flowsheets (Taken 7/12/2024 0207)  Promote skin healing: Protective dressings over bony prominences  7/12/2024 0206 by Sheyla Pace RN  Outcome: Not Progressing   The patient's goals for the shift include      The clinical goals for the shift include CC      Problem: Pain - Adult  Goal: Verbalizes/displays adequate comfort level or baseline comfort level  Outcome: Not Progressing     Problem: Safety - Adult  Goal: Free from fall injury  Outcome: Not Progressing     Problem: Discharge Planning  Goal: Discharge to home or other facility with appropriate resources  Outcome: Not Progressing     Problem: Chronic Conditions and Co-morbidities  Goal: Patient's chronic conditions and co-morbidity symptoms are  monitored and maintained or improved  Outcome: Not Progressing     Problem: General Stroke  Goal: Establish a mutual long term goal with patient by discharge  Outcome: Not Progressing  Goal: Demonstrate improvement in neurological exam throughout the shift  Outcome: Not Progressing  Goal: Maintain BP within ordered limits throughout shift  Outcome: Not Progressing  Goal: Participate in treatment (ie., meds, therapy) throughout shift  Outcome: Not Progressing  Goal: No symptoms of aspiration throughout shift  Outcome: Not Progressing  Goal: No symptoms of hemorrhage throughout shift  Outcome: Not Progressing  Goal: Tolerate enteral feeding throughout shift  Outcome: Not Progressing  Goal: Decreased nausea/vomiting throughout shift  Outcome: Not Progressing  Goal: Controlled blood glucose throughout shift  Outcome: Not Progressing  Goal: Out of bed three times today  Outcome: Not Progressing     Problem: ICU Stroke  Goal: Maintain ICP within ordered limits throughout shift  Outcome: Not Progressing  Goal: Tolerate EVD clamping trial throughout shift  Outcome: Not Progressing  Goal: Tolerate ventilator weaning trial during shift  Outcome: Not Progressing  Goal: Maintain patent airway throughout shift  Outcome: Not Progressing  Goal: Achieve/maintain targeted sodium level throughout shift  Outcome: Not Progressing     Problem: Skin  Goal: Decreased wound size/increased tissue granulation at next dressing change  7/12/2024 0207 by Sheyla Pace RN  Flowsheets (Taken 7/12/2024 0207)  Decreased wound size/increased tissue granulation at next dressing change: Protective dressings over bony prominences  7/12/2024 0206 by Sheyla Pace RN  Outcome: Not Progressing  Goal: Participates in plan/prevention/treatment measures  7/12/2024 0207 by Sheyla Pace RN  Flowsheets (Taken 7/12/2024 0207)  Participates in plan/prevention/treatment measures: Elevate heels  7/12/2024 0206 by Sheyla Pace RN  Outcome: Not Progressing  Goal:  Prevent/manage excess moisture  7/12/2024 0207 by Sheyla Pace RN  Flowsheets (Taken 7/12/2024 0207)  Prevent/manage excess moisture: Cleanse incontinence/protect with barrier cream  7/12/2024 0206 by Sheyla Pace RN  Outcome: Not Progressing  Goal: Prevent/minimize sheer/friction injuries  7/12/2024 0207 by Sheyla Pace RN  Flowsheets (Taken 7/12/2024 0207)  Prevent/minimize sheer/friction injuries: Use pull sheet  7/12/2024 0206 by Sheyla Pace RN  Outcome: Not Progressing  Goal: Promote/optimize nutrition  7/12/2024 0207 by Sheyla Pace RN  Flowsheets (Taken 7/12/2024 0207)  Promote/optimize nutrition: Offer water/supplements/favorite foods  7/12/2024 0206 by Sheyla Pace RN  Outcome: Not Progressing  Goal: Promote skin healing  7/12/2024 0207 by Sheyla Pace RN  Flowsheets (Taken 7/12/2024 0207)  Promote skin healing: Protective dressings over bony prominences  7/12/2024 0206 by Sheyla Pace RN  Outcome: Not Progressing

## 2024-07-12 NOTE — ED PROVIDER NOTES
7/11/2024  History/Exam limitations: mental status.   Additional history was obtained from EMS personnel.    HPI:       Todd Escudero is a 89 y.o. with a history of atrial fibrillation on Coumadin presents to the emergency department headache and mental status change.  Patient called EMS because of severe headache.  On EMS arrival, patient became aphasic.  And round, he began to have posturing.  On arrival, patient does not voice.  He does not withdraw to pain.  He has fixed pupils..       Last Known Well Time: 2200        ------------------------------------------------------------------------------------------------------------------------------------------     Physical Exam:    ED Triage Vitals   Temp Pulse Resp BP   -- -- -- --      SpO2 Temp src Heart Rate Source Patient Position   -- -- -- --      BP Location FiO2 (%)     -- --          Gen: No significant response to pain  Head/Neck: NCAT  Eyes: Anicteric sclerae, noninjected conjunctivae, pupils fixed and dilated  Nose: No rhinorrhea  Mouth:  MMM  Heart: Regular rate and rhythm w/ no murmurs, rubs, gallops  Lungs: CTAB w/o wheezes, rales, rhonchi, no increased work of breathing  Abdomen: Soft, NT/ND  Musculoskeletal: No deformities  Extremities: No edema.  Neurologic: Please see below for NIHSS  Skin: No rashes noted  Psychological: Calm        Interval: Baseline  Time: 10:25 PM  Person Administering Scale: Piero Aragon MD     1a  Level of consciousness: 3=responds only with reflex motor or automatic effects or totally unresponsive, flaccid, areflexic   1b. LOC questions:  2=Performs neither task correctly   1c. LOC commands: 2=Performs neither task correctly   2.  Best Gaze: 2=forced deviation, or total gaze paresis not overcome by oculocephalic maneuver   3. Visual: 3=Bilateral hemianopia (blind including cortical blindness)   4. Facial Palsy: 3=Complete paralysis of one or both sides (absence of facial movement in the upper and lower face)   5a.  Motor left arm: 4=No movement   5b.  Motor right arm: 4=No movement   6a. motor left le=No movement   6b  Motor right le=No movement   7. Limb Ataxia: 2=Present in two limbs   8.  Sensory: 2=Severe to total sensory loss; patient is not aware of being touched in face, arm, leg   9. Best Language:  3=Mute, global aphasia; no usable speech or auditory comprehension   10. Dysarthria: 2=Severe; patient speech is so slurred as to be unintelligible in the absence of or our of proportion to any dysphagia, or is mute/anarthric   11. Extinction and Inattention: 2=Profound héctor-inattention or héctor-inattention to more than one modality. Does not recognize own hand or orients only to one side of space     Total:   42         VAN: VAN: Negative     ------------------------------------------------------------------------------------------------------------------------------------------     Medical Decision Making:     ED Course as of 24   Thu  CBC unremarkable. [MK]      ED Course User Index  [MK] Piero Aragon MD         Diagnoses as of 24   Hemorrhagic stroke (Multi)     Patient presents to the emergency department acute change of mental status.  His symptoms do seem most consistent with intracranial hemorrhage.  Patient was sent to [facility] for noncontrast head CT.  This does show rather significant hemorrhage bilaterally with impending herniation.  I did have a long conversation with family and with neurosurgery.  Patient's pupils already fixed.  I do not feel that this is survivable.  They do not want to undertake aggressive measures given his very poor prognosis.  At this point, patient was transition to comfort care and will be admitted with comfort meds and expectant management.    Independent Interpretation of Studies: I independently interpreted the CT head and see Evidence of intracranial hemorrhage    Chronic Medical Conditions Significantly Affecting Care: Atrial  fibrillation on anticoagulation    Social Determinants of Health Significantly Affecting Care:  None     External Records Reviewed: I reviewed recent and relevant outside records including: Outpatient medication list which includes Coumadin     Discussion of Management with Other Providers:   I discussed the patient/results with: Neurosurgery, neurology and the admitting team      IV Thrombolysis IV Thrombolysis Checklist        IV Thrombolysis Given: No; Thrombolysis contraindication reason: CT (or MRI) with evidence of Acute Intracranial Hemorrhage          Procedure  Critical Care    Performed by: Piero Aragon MD  Authorized by: Piero Aragon MD    Critical care provider statement:     Critical care time (minutes):  35    Critical care time was exclusive of:  Separately billable procedures and treating other patients and teaching time    Critical care was necessary to treat or prevent imminent or life-threatening deterioration of the following conditions:  CNS failure or compromise    Critical care was time spent personally by me on the following activities:  Ordering and performing treatments and interventions, ordering and review of laboratory studies, ordering and review of radiographic studies, re-evaluation of patient's condition, review of old charts, examination of patient, discussions with primary provider, discussions with consultants and evaluation of patient's response to treatment    Care discussed with: admitting provider             Piero Aragon MD  07/11/24 [id number]

## 2024-07-12 NOTE — SIGNIFICANT EVENT
I was called to the bedside to pronounce that patient in room 716-A had .  No spontaneous movements were present.  There was no response to verbal or tactile stimuli.  Pupils were dilated and fixed.  Corneal reflex absent.  No breath sounds were auscultated.  No carotid pulse was palpable.  No heart sounds were auscultated over entire precordium. Patient pronounced at 6:14 on 24.

## 2024-07-12 NOTE — SIGNIFICANT EVENT
Goal of Care/Code Status    Goals of care discussed with family at bedside. Patient's wife - Rupa Escudero ([phone]) advises that the patient would have wanted switched to comfort measures at this time. Family is unanimous with this decision. They would like to start comfort measures now and be switched to inpatient hospice when possible.    CODE STATUS - DNR - Comfort Measures Only

## 2024-08-06 ENCOUNTER — APPOINTMENT (OUTPATIENT)
Dept: PRIMARY CARE | Facility: CLINIC | Age: 89
End: 2024-08-06
Payer: MEDICARE

## 2025-06-16 ENCOUNTER — APPOINTMENT (OUTPATIENT)
Dept: PRIMARY CARE | Facility: CLINIC | Age: OVER 89
End: 2025-06-16
Payer: MEDICARE